# Patient Record
Sex: FEMALE | Race: WHITE | NOT HISPANIC OR LATINO | URBAN - METROPOLITAN AREA
[De-identification: names, ages, dates, MRNs, and addresses within clinical notes are randomized per-mention and may not be internally consistent; named-entity substitution may affect disease eponyms.]

---
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
## 2022-02-12 VITALS
HEIGHT: 64 IN | TEMPERATURE: 98.2 F | HEART RATE: 62 BPM | DIASTOLIC BLOOD PRESSURE: 82 MMHG | BODY MASS INDEX: 28.82 KG/M2 | TEMPERATURE: 98.5 F | WEIGHT: 168.8 LBS | OXYGEN SATURATION: 98 % | SYSTOLIC BLOOD PRESSURE: 106 MMHG | WEIGHT: 157.6 LBS | HEIGHT: 64 IN | HEART RATE: 110 BPM | SYSTOLIC BLOOD PRESSURE: 110 MMHG | DIASTOLIC BLOOD PRESSURE: 64 MMHG | BODY MASS INDEX: 26.91 KG/M2

## 2022-02-12 VITALS
TEMPERATURE: 98.3 F | WEIGHT: 182 LBS | HEIGHT: 65 IN | SYSTOLIC BLOOD PRESSURE: 122 MMHG | DIASTOLIC BLOOD PRESSURE: 78 MMHG | HEART RATE: 70 BPM | BODY MASS INDEX: 30.32 KG/M2
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
## 2022-02-15 NOTE — LETTER
(Inserted Image. Unable to display)     January 02, 2019      HARVEY BOLDEN   [place], [place] [id number]          Dear HARVEY,      Thank you for selecting [facility] (previously [facility] [place]) for your healthcare needs.      Our records indicate you are due for the following services:     Follow-up office visit.      To schedule an appointment or if you have further questions, please contact your primary clinic:   [facility]       [phone]   [facility]       [phone]              [facility]     [phone]      Powered by [employer]    Sincerely,    Hannah Jiménez MD
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
## 2022-02-15 NOTE — NURSING NOTE
Depression Screening Entered On:  4/4/2019 3:16 PM CDT    Performed On:  4/3/2019 3:16 PM CDT by Lynette Tovar MA               Depression Screening   Little Interest - Pleasure in Activities :   Nearly every day   Feeling Down, Depressed, Hopeless :   Nearly every day   Initial Depression Screen Score :   6    Trouble Falling or Staying Asleep :   Nearly every day   Feeling Tired or Little Energy :   Nearly every day   Poor Appetite or Overeating :   Nearly every day   Feeling Bad About Yourself :   Nearly every day   Trouble Concentrating :   Not at all   Moving or Speaking Slowly :   Several days   Thoughts Better Off Dead or Hurting Self :   Nearly every day   Detailed Depression Screen Score :   16    Total Depression Screen Score :   22    SEVEN Difficulty with Work, Home, Others :   Extremely difficult   Lynette Tovar MA - 4/4/2019 3:16 PM CDT

## 2022-02-15 NOTE — NURSING NOTE
Comprehensive Intake Entered On:  4/3/2019 2:25 PM CDT    Performed On:  4/3/2019 2:18 PM CDT by Yue Rodney LPN               Summary   Chief Complaint :   Depression concerns. Has not been taking medications for the past few months.    Weight Measured :   182 lb(Converted to: 182 lb 0 oz, 82.55 kg)    Height Measured :   64.5 in(Converted to: 5 ft 4 in, 163.83 cm)    Body Mass Index :   30.75 kg/m2   Body Surface Area :   1.94 m2   Systolic Blood Pressure :   122 mmHg   Diastolic Blood Pressure :   78 mmHg   Mean Arterial Pressure :   93 mmHg   Peripheral Pulse Rate :   70 bpm   BP Site :   Right arm   Pulse Site :   Radial artery   BP Method :   Manual   HR Method :   Manual   Temperature Tympanic :   98.3 DegF(Converted to: 36.8 DegC)    Yue Rodney LPN - 4/3/2019 2:18 PM CDT   Health Status   Allergies Verified? :   Yes   Medication History Verified? :   Yes   Pre-Visit Planning Status :   Completed   Tobacco Use? :   Never smoker   Yue Rodney LPN - 4/3/2019 2:18 PM CDT   Consents   Consent for Immunization Exchange :   Consent Granted   Consent for Immunizations to Providers :   Consent Granted   Yue Rodney LPN - 4/3/2019 2:18 PM CDT   Meds / Allergies   (As Of: 4/3/2019 2:25:38 PM CDT)   Allergies (Active)   No Known Medication Allergies  Estimated Onset Date:   Unspecified ; Created By:   Shu Ferrari CMA; Reaction Status:   Active ; Category:   Drug ; Substance:   No Known Medication Allergies ; Type:   Allergy ; Updated By:   Shu Ferrari CMA; Reviewed Date:   4/3/2019 2:22 PM CDT        Medication List   (As Of: 4/3/2019 2:25:38 PM CDT)   Prescription/Discharge Order    triamcinolone topical  :   triamcinolone topical ; Status:   Prescribed ; Ordered As Mnemonic:   triamcinolone 0.1% topical cream ; Simple Display Line:   1 joao, Topical, tid, 60 gm, 0 Refill(s) ; Ordering Provider:   Luiz Pelaez MD; Catalog Code:   triamcinolone topical ; Order Dt/Tm:   10/23/2018  2:12:46 PM          ethinyl estradiol-levonorgestrel  :   ethinyl estradiol-levonorgestrel ; Status:   Processing ; Ordered As Mnemonic:   Lessina 100 mcg-20 mcg oral tablet ; Ordering Provider:   Hannah Jiménez MD; Action Display:   Complete ; Catalog Code:   ethinyl estradiol-levonorgestrel ; Order Dt/Tm:   4/3/2019 2:22:42 PM          amphetamine-dextroamphetamine  :   amphetamine-dextroamphetamine ; Status:   Processing ; Ordered As Mnemonic:   Adderall XR 20 mg oral capsule, extended release ; Ordering Provider:   Hannah Jiménez MD; Action Display:   Complete ; Catalog Code:   amphetamine-dextroamphetamine ; Order Dt/Tm:   4/3/2019 2:22:48 PM            Home Meds    etonogestrel  :   etonogestrel ; Status:   Documented ; Ordered As Mnemonic:   Nexplanon 68 mg subcutaneous implant ; Simple Display Line:   68 mg, 1 EA, Subcutaneous, once, 11/2018, 0 Refill(s) ; Catalog Code:   etonogestrel ; Order Dt/Tm:   4/3/2019 2:22:22 PM          buPROPion  :   buPROPion ; Status:   Completed ; Ordered As Mnemonic:   Wellbutrin  mg/24 hours oral tablet, extended release ; Simple Display Line:   300 mg, 1 tab(s), Oral, q 24 hrs, 0 Refill(s) ; Catalog Code:   buPROPion ; Order Dt/Tm:   10/1/2018 3:52:26 PM          FLUoxetine  :   FLUoxetine ; Status:   Completed ; Ordered As Mnemonic:   PROzac ; Simple Display Line:   20 mg, Oral, daily, 0 Refill(s) ; Catalog Code:   FLUoxetine ; Order Dt/Tm:   10/1/2018 3:40:55 PM

## 2022-02-15 NOTE — NURSING NOTE
CAGE Assessment Entered On:  4/4/2019 3:16 PM CDT    Performed On:  4/3/2019 3:15 PM CDT by Lynette Tovar MA               Assessment   Have you ever felt you should cut down on your drinking :   No   Have people annoyed you by criticizing your drinking :   No   Have you ever felt bad or guilty about your drinking :   No   Have you ever taken a drink first thing in the morning to steady your nerves or get rid of a hangover (Eye-opener) :   No   CAGE Score :   0    Lynette Tovar MA - 4/4/2019 3:15 PM CDT

## 2022-02-15 NOTE — NURSING NOTE
Generalized Anxiety Disorder Screening Entered On:  4/4/2019 3:17 PM CDT    Performed On:  4/3/2019 3:16 PM CDT by Lynette Tovar MA               Generalized Anxiety Disorder Screening   SEVEN Nervous, Anxious On Edge :   Nearly every day   SEVEN Control Worrying B :   Nearly every day   SEVEN Worrying Too Much :   Nearly every day   SEVEN Restless :   Several days   SEVEN Easily Annoyed/Irritable :   Nearly every day   SEVEN Afraid :   Nearly every day   SEVEN Trouble Relaxing :   More than half the days   SEVEN Total Screening Score :   18    SEVEN Difficulty with Work, Home, Others :   Very difficult   Lynette Tovar MA - 4/4/2019 3:16 PM CDT